# Patient Record
Sex: MALE | Race: OTHER | NOT HISPANIC OR LATINO | URBAN - METROPOLITAN AREA
[De-identification: names, ages, dates, MRNs, and addresses within clinical notes are randomized per-mention and may not be internally consistent; named-entity substitution may affect disease eponyms.]

---

## 2022-03-09 ENCOUNTER — EMERGENCY (EMERGENCY)
Facility: HOSPITAL | Age: 47
LOS: 1 days | Discharge: HOME | End: 2022-03-09
Attending: EMERGENCY MEDICINE | Admitting: EMERGENCY MEDICINE
Payer: MEDICAID

## 2022-03-09 VITALS
OXYGEN SATURATION: 95 % | HEART RATE: 72 BPM | RESPIRATION RATE: 16 BRPM | WEIGHT: 179.9 LBS | TEMPERATURE: 98 F | SYSTOLIC BLOOD PRESSURE: 125 MMHG | DIASTOLIC BLOOD PRESSURE: 71 MMHG | HEIGHT: 74 IN

## 2022-03-09 DIAGNOSIS — S01.81XA LACERATION WITHOUT FOREIGN BODY OF OTHER PART OF HEAD, INITIAL ENCOUNTER: ICD-10-CM

## 2022-03-09 DIAGNOSIS — S02.2XXA FRACTURE OF NASAL BONES, INITIAL ENCOUNTER FOR CLOSED FRACTURE: ICD-10-CM
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
## 2022-03-09 PROCEDURE — 70486 CT MAXILLOFACIAL W/O DYE: CPT | Mod: 26

## 2022-03-09 PROCEDURE — 99284 EMERGENCY DEPT VISIT MOD MDM: CPT

## 2022-03-09 RX ORDER — OXYCODONE AND ACETAMINOPHEN 5; 325 MG/1; MG/1
1 TABLET ORAL ONCE
Refills: 0 | Status: DISCONTINUED | OUTPATIENT
Start: 2022-03-09 | End: 2022-03-09

## 2022-03-09 RX ADMIN — OXYCODONE AND ACETAMINOPHEN 1 TABLET(S): 5; 325 TABLET ORAL at 22:54

## 2022-03-09 RX ADMIN — OXYCODONE AND ACETAMINOPHEN 1 TABLET(S): 5; 325 TABLET ORAL at 22:53
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
## 2022-03-09 NOTE — PROCEDURE NOTE - ADDITIONAL PROCEDURE DETAILS
PRE-PROCEDURE DIAGNOSIS:  1. BILATERAL NASAL FRACTURES, DISPLACED  2. 2.6CM DORSAL NASAL LACERATION  3. 1CM LEFT CHEEK/LOWER EYELID LACERATION    POST-PROCEDURE DIAGNOSIS:   SAME    PROCEDURES:  1. CLOSED NASAL REDUCTION  2. INTERMEDIATE LACERATION REPAIR OF 2.6CM DORSAL NASAL LACERATION  3. SIMPLE LACERATION REPAIR OF 1CM LEFT CHEEK/LOWER LID LACERATION    ANESTHESIA:   Infraorbital nerve block, bilateral- 6ml of 1% Lidocaine with Epi  Local regional anesthetic injection to laceration burden- 3ml of 1% Lidocaine with Epi    EBL: <2ml    PROCEDURE DETAILS:   Midface prepped and draped in usual sterile fashion.   Afrin soaked pledgets x2 placed into each nostril for 10 minutes, during which bilateral infra-orbital nerve blocks performed with 1% Lidocaine with epinephrine. Additional anesthetic injected along laceration burden of dorsal nasum and left cheek.   Patient positioned into upright position with sterile drapes placed accordingly.   Boies elevator measured out to distance of medial canthus, externally.  Pledgets removed. Elevator placed into the depressed side first, with fingers providing external support, with gentle sweeping and reduction maneuvers the deviation was counter-corrected. Similar procedure performed along contralateral nostril. Notable advancement and movement of the nasum towards midline, to more normal/anatomic alignment. Minimal bleeding subsequent to this. I then turned my attention to the laceration burden. The serpentine 2.6cm dorsal laceration was irrigated with normal saline and betadine, then repaired in layers. With 5-0 Monocryl for the deep dermal plane, followed by 6-0 Nylon for superficial skin reapproximation.  The smaller 1cm laceration along the left cheek required simple laceration repair with 5-0 plain gut sutures placed in interrupted fashion.   Following this the reduction was re-assessed. Despite overt swelling to midvault overall alignment was notably improved. I then proceeded to place steri-strips to nasal dorsum to assist with laceration wound care and address focal nasal swelling. A protective soft splint was then applied and secured to nasal dorsum.     Post-procedure instructions provided to patient. He was further instructed to present to the office for interval assessment and suture removal in 5-8 days time.

## 2022-03-09 NOTE — ED PROVIDER NOTE - PHYSICAL EXAMINATION
CONSTITUTIONAL: Well appearing, well nourished, awake, alert, oriented to person, place, time/situation and in no apparent distress.  ENT: Airway patent, Nasal mucosa with dried blood.  +deformity to nasal bone with L sided edema. Mouth with normal mucosa.  EYES: Clear bilaterally.  RESPIRATORY: Breathing comfortably with normal RR.  MSK: Range of motion is not limited, no deformities noted.  NEURO: Alert and oriented, no focal deficits.  SKIN: ~3cm stellite laceration to bridge of nose.  ~1cm laceration to inferior aspect of L lower eyelid.   PSYCH: Alert and oriented to person, place, time/situation. normal mood and affect. no apparent risk to self or others.

## 2022-03-09 NOTE — ED PROVIDER NOTE - PROGRESS NOTE DETAILS
Dr. Lyman of plastic surgery consulted and currently at bedside.  Reviews CT scan and notes nasal bone fractures. Dr. Lyman performed nasal bone reduction and splinting and laceration repair.  Percocet given for pain control in addition to local lidocaine given by Dr. Lyman.

## 2022-03-09 NOTE — ED PROVIDER NOTE - CARE PLAN
1 Principal Discharge DX:	Facial laceration, initial encounter  Secondary Diagnosis:	Nasal bone fractures

## 2022-03-09 NOTE — ED PROVIDER NOTE - DISPOSITION TYPE
[Up to date] : Up to date [Mother] : mother [Eats meals with family] : eats meals with family [Grade: ____] : Grade: [unfilled] [Eats regular meals including adequate fruits and vegetables] : eats regular meals including adequate fruits and vegetables [Has friends] : has friends [Uses safety belts/safety equipment] : uses safety belts/safety equipment  [With Teen] : teen [FreeTextEntry7] : well since last visit [Goes to dentist yearly] : Patient does not go to dentist yearly [Uses electronic nicotine delivery system] : does not use electronic nicotine delivery system [Uses tobacco] : does not use tobacco [Uses drugs] : does not use drugs  [Drinks alcohol] : does not drink alcohol [Cigarette smoke exposure] : No cigarette smoke exposure [Has had sexual intercourse] : has not had sexual intercourse [FreeTextEntry1] : HM  and Immun DISCHARGE
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
## 2022-03-09 NOTE — ED ADULT TRIAGE NOTE - PAIN RATING/NUMBER SCALE (0-10): ACTIVITY
[General Appearance - Well Developed] : well developed [General Appearance - Well Nourished] : well nourished [Normal Appearance] : normal appearance [Well Groomed] : well groomed [General Appearance - In No Acute Distress] : no acute distress [Abdomen Soft] : soft [Abdomen Tenderness] : non-tender [Costovertebral Angle Tenderness] : no ~M costovertebral angle tenderness [Urethral Meatus] : meatus normal [Urinary Bladder Findings] : the bladder was normal on palpation [Scrotum] : the scrotum was normal [Testes Mass (___cm)] : there were no testicular masses [No Prostate Nodules] : no prostate nodules [Edema] : no peripheral edema [] : no respiratory distress [Respiration, Rhythm And Depth] : normal respiratory rhythm and effort [Exaggerated Use Of Accessory Muscles For Inspiration] : no accessory muscle use [Oriented To Time, Place, And Person] : oriented to person, place, and time [Affect] : the affect was normal [Mood] : the mood was normal [Not Anxious] : not anxious [Normal Station and Gait] : the gait and station were normal for the patient's age [No Focal Deficits] : no focal deficits [No Palpable Adenopathy] : no palpable adenopathy 5
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
## 2022-03-09 NOTE — ED PROVIDER NOTE - PATIENT PORTAL LINK FT
You can access the FollowMyHealth Patient Portal offered by [facility] by registering at the following website: [email or web address]. By joining [employer]’s FollowMyHealth portal, you will also be able to view your health information using other applications (apps) compatible with our system.

## 2022-03-09 NOTE — ED PROVIDER NOTE - NSFOLLOWUPINSTRUCTIONS_ED_ALL_ED_FT
Facial Laceration    AMBULATORY CARE:    A facial laceration is a tear or cut in the skin. Facial lacerations may be closed within 24 hours of injury.    Seek care immediately if:   •You have a fever and the wound is painful, warm, or swollen. The wound area may be red, or fluid may come out of it.      •You have heavy bleeding or bleeding that does not stop after 10 minutes of holding firm, direct pressure over the wound.      Call your doctor if:   •Your wound reopens or your tape comes off.      •Your wound is very painful.      •Your wound is not healing, or you think there is an object in the wound.      •The skin around your wound stays numb.      •You have questions or concerns about your condition or care.      Medicines:   •Antibiotics may be given to prevent an infection if your wound was deep and had to be cleaned out.       •Take your medicine as directed. Contact your healthcare provider if you think your medicine is not helping or if you have side effects. Tell him of her if you are allergic to any medicine. Keep a list of the medicines, vitamins, and herbs you take. Include the amounts, and when and why you take them. Bring the list or the pill bottles to follow-up visits. Carry your medicine list with you in case of an emergency.      Care for your wound: Care for your wound as directed to prevent infection and help it heal. Wash your hands with soap and warm water before and after you care for your wound. You may need to keep the wound dry for the first 24 to 48 hours. When your healthcare provider says it is okay, wash around your wound with soap and water, or as directed. Gently pat the area dry. Do not use alcohol or hydrogen peroxide to clean your wound unless you are directed to.  •Do not take aspirin or NSAIDs for 24 hours after being injured. Aspirin and NSAIDs can increase blood flow. Your laceration may continue to bleed.      •Do not take hot showers, eat or drink hot foods and liquids for 48 hours after being injured. Also, do not use a heating pad near your laceration. The heat can cause swelling in and around your laceration.      •If your wound was covered with a bandage, leave your bandage on as long as directed. Bandages keep your wound clean and protected. They can also prevent swelling. Ask when and how to change your bandage. Be careful not to apply the bandage or tape too tightly. This could cut off blood flow and cause more injury.      •If your wound was closed with stitches, keep your wound clean. Your healthcare provider may recommend that you apply antibiotic ointment after you clean your wound.      •If your wound was closed with wound tape or medical strips, keep the area clean and dry. The strips will usually fall off on their own after several days.      •If your wound was closed with tissue glue, do not use any ointments or lotions on the area. You may shower, but do not swim or soak in a bathtub. Gently pat the area dry after you take a shower. Do not pick at or scrub the glue area.      Decrease scarring: The skin in the area of your wound may turn a different color if it is exposed to direct sunlight. After your wound is healed, use sunscreen over the area when you are out in the sun. You should do this for at least 6 months to 1 year after your injury. Some wounds scar less if they are covered while they heal.    Follow up with your doctor as directed: You may need to follow up in 24 to 48 hours to have your wound checked for infection. You may need to return in 3 to 5 days if you have stitches that need to be removed. Write down your questions so you remember to ask them during your visits. Facial Laceration    AMBULATORY CARE:    A facial laceration is a tear or cut in the skin. Facial lacerations may be closed within 24 hours of injury.    Seek care immediately if:   •You have a fever and the wound is painful, warm, or swollen. The wound area may be red, or fluid may come out of it.      •You have heavy bleeding or bleeding that does not stop after 10 minutes of holding firm, direct pressure over the wound.      Call your doctor if:   •Your wound reopens or your tape comes off.      •Your wound is very painful.      •Your wound is not healing, or you think there is an object in the wound.      •The skin around your wound stays numb.      •You have questions or concerns about your condition or care.      Medicines:   •Antibiotics may be given to prevent an infection if your wound was deep and had to be cleaned out.       •Take your medicine as directed. Contact your healthcare provider if you think your medicine is not helping or if you have side effects. Tell him of her if you are allergic to any medicine. Keep a list of the medicines, vitamins, and herbs you take. Include the amounts, and when and why you take them. Bring the list or the pill bottles to follow-up visits. Carry your medicine list with you in case of an emergency.      Care for your wound: Care for your wound as directed to prevent infection and help it heal. Wash your hands with soap and warm water before and after you care for your wound. You may need to keep the wound dry for the first 24 to 48 hours. When your healthcare provider says it is okay, wash around your wound with soap and water, or as directed. Gently pat the area dry. Do not use alcohol or hydrogen peroxide to clean your wound unless you are directed to.  •Do not take aspirin or NSAIDs for 24 hours after being injured. Aspirin and NSAIDs can increase blood flow. Your laceration may continue to bleed.      •Do not take hot showers, eat or drink hot foods and liquids for 48 hours after being injured. Also, do not use a heating pad near your laceration. The heat can cause swelling in and around your laceration.      •If your wound was covered with a bandage, leave your bandage on as long as directed. Bandages keep your wound clean and protected. They can also prevent swelling. Ask when and how to change your bandage. Be careful not to apply the bandage or tape too tightly. This could cut off blood flow and cause more injury.      •If your wound was closed with stitches, keep your wound clean. Your healthcare provider may recommend that you apply antibiotic ointment after you clean your wound.      •If your wound was closed with wound tape or medical strips, keep the area clean and dry. The strips will usually fall off on their own after several days.      •If your wound was closed with tissue glue, do not use any ointments or lotions on the area. You may shower, but do not swim or soak in a bathtub. Gently pat the area dry after you take a shower. Do not pick at or scrub the glue area.      Decrease scarring: The skin in the area of your wound may turn a different color if it is exposed to direct sunlight. After your wound is healed, use sunscreen over the area when you are out in the sun. You should do this for at least 6 months to 1 year after your injury. Some wounds scar less if they are covered while they heal.    Follow up with your doctor as directed: You may need to follow up in 24 to 48 hours to have your wound checked for infection. You may need to return in 3 to 5 days if you have stitches that need to be removed. Write down your questions so you remember to ask them during your visits.      Nasal Fracture      A fracture is a break in a bone. A nasal fracture is a broken nose. Minor breaks do not need treatment. They often heal on their own in about one month. Serious breaks may need treatment. Sometimes surgery is needed.      What are the causes?    This condition is usually caused by a direct hit to the nose (blunt injury). This often occurs from:  •Playing a contact sport.       •Being in a car accident.      •Falling.       •Getting punched.        What are the signs or symptoms?    •Pain.       •Swelling of the nose.       •Bleeding from the nose.       •Bruising around the nose or bruising around the eyes (black eyes).      •The nose having a crooked shape.        How is this treated?    Treatment depends on how bad the injury is.  •Minor breaks often do not need treatment.    •For more serious breaks that have caused bones to move out of position, treatment may involve one of these:  •Moving the bones back into position without surgery. Your doctor may be able to do this in his or her office after you are given medicine to numb the nose area (local anesthetic).      •Surgery. If needed, this will be done after the swelling is gone.          Follow these instructions at home:    Activity     •Return to your normal activities as told by your doctor. Ask your doctor what activities are safe for you.      • Do not play contact sports for 3–4 weeks or as told by your doctor.        General instructions                 •If told, put ice on the injured area:  •Put ice in a plastic bag.      •Place a towel between your skin and the bag.      •Leave the ice on for 20 minutes, 2–3 times a day.        •Take over-the-counter and prescription medicines only as told by your doctor.      •If your nose bleeds, sit up while you gently squeeze your nose shut for 10 minutes.      •Try to not blow your nose.      •Keep all follow-up visits as told by your doctor. This is important.        Contact a doctor if:    •You have more pain or very bad pain.      •You keep having nosebleeds.      •The shape of your nose does not return to normal after 5 days.      •You have pus coming out of your nose.        Get help right away if:    •Your nose bleeds for more than 20 minutes.      •You have clear fluid draining out of your nose.      •You have a swelling on the inside of your nose that does not get better.      •You have trouble moving your eyes.      •You keep throwing up (vomiting).        Summary    •A nasal fracture is a broken nose.      •Symptoms include pain, swelling, and bruising.      •Minor breaks often do not require treatment. More serious breaks may require surgery or other treatments.      •If your nose bleeds, sit up while you gently squeeze your nose shut for 10 minutes.      This information is not intended to replace advice given to you by your health care provider. Make sure you discuss any questions you have with your health care provider.
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
